# Patient Record
Sex: FEMALE | Race: WHITE | NOT HISPANIC OR LATINO | Employment: FULL TIME | URBAN - METROPOLITAN AREA
[De-identification: names, ages, dates, MRNs, and addresses within clinical notes are randomized per-mention and may not be internally consistent; named-entity substitution may affect disease eponyms.]

---

## 2024-03-21 ENCOUNTER — LAB (OUTPATIENT)
Dept: LAB | Facility: LAB | Age: 59
End: 2024-03-21
Payer: COMMERCIAL

## 2024-03-21 ENCOUNTER — OFFICE VISIT (OUTPATIENT)
Dept: PRIMARY CARE | Facility: CLINIC | Age: 59
End: 2024-03-21
Payer: COMMERCIAL

## 2024-03-21 VITALS
WEIGHT: 147 LBS | HEIGHT: 70 IN | SYSTOLIC BLOOD PRESSURE: 108 MMHG | HEART RATE: 80 BPM | TEMPERATURE: 98.1 F | DIASTOLIC BLOOD PRESSURE: 80 MMHG | OXYGEN SATURATION: 98 % | BODY MASS INDEX: 21.05 KG/M2

## 2024-03-21 DIAGNOSIS — E55.9 VITAMIN D DEFICIENCY: ICD-10-CM

## 2024-03-21 DIAGNOSIS — M25.569 RECURRENT KNEE PAIN, UNSPECIFIED LATERALITY: ICD-10-CM

## 2024-03-21 DIAGNOSIS — Z00.00 PHYSICAL EXAM: ICD-10-CM

## 2024-03-21 DIAGNOSIS — Z23 NEED FOR DIPHTHERIA-TETANUS-PERTUSSIS (TDAP) VACCINE: ICD-10-CM

## 2024-03-21 DIAGNOSIS — Z00.00 PHYSICAL EXAM: Primary | ICD-10-CM

## 2024-03-21 DIAGNOSIS — Z85.820 HX OF MELANOMA OF SKIN: ICD-10-CM

## 2024-03-21 LAB
25(OH)D3 SERPL-MCNC: 29 NG/ML (ref 30–100)
ALBUMIN SERPL BCP-MCNC: 4.5 G/DL (ref 3.4–5)
ALP SERPL-CCNC: 63 U/L (ref 33–110)
ALT SERPL W P-5'-P-CCNC: 10 U/L (ref 7–45)
ANION GAP SERPL CALC-SCNC: 9 MMOL/L (ref 10–20)
APPEARANCE UR: CLEAR
AST SERPL W P-5'-P-CCNC: 15 U/L (ref 9–39)
BACTERIA #/AREA URNS AUTO: ABNORMAL /HPF
BASOPHILS # BLD AUTO: 0.03 X10*3/UL (ref 0–0.1)
BASOPHILS NFR BLD AUTO: 0.7 %
BILIRUB SERPL-MCNC: 0.4 MG/DL (ref 0–1.2)
BILIRUB UR STRIP.AUTO-MCNC: NEGATIVE MG/DL
BUN SERPL-MCNC: 12 MG/DL (ref 6–23)
CALCIUM SERPL-MCNC: 9.2 MG/DL (ref 8.6–10.3)
CHLORIDE SERPL-SCNC: 105 MMOL/L (ref 98–107)
CHOLEST SERPL-MCNC: 200 MG/DL (ref 0–199)
CHOLESTEROL/HDL RATIO: 3.3
CO2 SERPL-SCNC: 30 MMOL/L (ref 21–32)
COLOR UR: ABNORMAL
CREAT SERPL-MCNC: 0.64 MG/DL (ref 0.5–1.05)
EGFRCR SERPLBLD CKD-EPI 2021: >90 ML/MIN/1.73M*2
EOSINOPHIL # BLD AUTO: 0.1 X10*3/UL (ref 0–0.7)
EOSINOPHIL NFR BLD AUTO: 2.2 %
ERYTHROCYTE [DISTWIDTH] IN BLOOD BY AUTOMATED COUNT: 12.2 % (ref 11.5–14.5)
GLUCOSE SERPL-MCNC: 93 MG/DL (ref 74–99)
GLUCOSE UR STRIP.AUTO-MCNC: NEGATIVE MG/DL
HCT VFR BLD AUTO: 43.9 % (ref 36–46)
HDLC SERPL-MCNC: 60.7 MG/DL
HGB BLD-MCNC: 14 G/DL (ref 12–16)
IMM GRANULOCYTES # BLD AUTO: 0.01 X10*3/UL (ref 0–0.7)
IMM GRANULOCYTES NFR BLD AUTO: 0.2 % (ref 0–0.9)
KETONES UR STRIP.AUTO-MCNC: NEGATIVE MG/DL
LDLC SERPL CALC-MCNC: 120 MG/DL
LEUKOCYTE ESTERASE UR QL STRIP.AUTO: ABNORMAL
LYMPHOCYTES # BLD AUTO: 1.54 X10*3/UL (ref 1.2–4.8)
LYMPHOCYTES NFR BLD AUTO: 34.2 %
MAGNESIUM SERPL-MCNC: 2.07 MG/DL (ref 1.6–2.4)
MCH RBC QN AUTO: 31.3 PG (ref 26–34)
MCHC RBC AUTO-ENTMCNC: 31.9 G/DL (ref 32–36)
MCV RBC AUTO: 98 FL (ref 80–100)
MONOCYTES # BLD AUTO: 0.37 X10*3/UL (ref 0.1–1)
MONOCYTES NFR BLD AUTO: 8.2 %
MUCOUS THREADS #/AREA URNS AUTO: ABNORMAL /LPF
NEUTROPHILS # BLD AUTO: 2.45 X10*3/UL (ref 1.2–7.7)
NEUTROPHILS NFR BLD AUTO: 54.5 %
NITRITE UR QL STRIP.AUTO: NEGATIVE
NON HDL CHOLESTEROL: 139 MG/DL (ref 0–149)
NRBC BLD-RTO: 0 /100 WBCS (ref 0–0)
PH UR STRIP.AUTO: 7 [PH]
PLATELET # BLD AUTO: 220 X10*3/UL (ref 150–450)
POTASSIUM SERPL-SCNC: 4.8 MMOL/L (ref 3.5–5.3)
PROT SERPL-MCNC: 6.5 G/DL (ref 6.4–8.2)
PROT UR STRIP.AUTO-MCNC: NEGATIVE MG/DL
RBC # BLD AUTO: 4.48 X10*6/UL (ref 4–5.2)
RBC # UR STRIP.AUTO: NEGATIVE /UL
RBC #/AREA URNS AUTO: ABNORMAL /HPF
SODIUM SERPL-SCNC: 139 MMOL/L (ref 136–145)
SP GR UR STRIP.AUTO: 1
SQUAMOUS #/AREA URNS AUTO: ABNORMAL /HPF
TRIGL SERPL-MCNC: 96 MG/DL (ref 0–149)
TSH SERPL-ACNC: 1.61 MIU/L (ref 0.44–3.98)
UROBILINOGEN UR STRIP.AUTO-MCNC: <2 MG/DL
VLDL: 19 MG/DL (ref 0–40)
WBC # BLD AUTO: 4.5 X10*3/UL (ref 4.4–11.3)
WBC #/AREA URNS AUTO: ABNORMAL /HPF

## 2024-03-21 PROCEDURE — 83036 HEMOGLOBIN GLYCOSYLATED A1C: CPT

## 2024-03-21 PROCEDURE — 80061 LIPID PANEL: CPT

## 2024-03-21 PROCEDURE — 85025 COMPLETE CBC W/AUTO DIFF WBC: CPT

## 2024-03-21 PROCEDURE — 1036F TOBACCO NON-USER: CPT | Performed by: FAMILY MEDICINE

## 2024-03-21 PROCEDURE — 83735 ASSAY OF MAGNESIUM: CPT

## 2024-03-21 PROCEDURE — 82306 VITAMIN D 25 HYDROXY: CPT

## 2024-03-21 PROCEDURE — 36415 COLL VENOUS BLD VENIPUNCTURE: CPT

## 2024-03-21 PROCEDURE — 84443 ASSAY THYROID STIM HORMONE: CPT

## 2024-03-21 PROCEDURE — 90471 IMMUNIZATION ADMIN: CPT | Performed by: FAMILY MEDICINE

## 2024-03-21 PROCEDURE — 90715 TDAP VACCINE 7 YRS/> IM: CPT | Performed by: FAMILY MEDICINE

## 2024-03-21 PROCEDURE — 80053 COMPREHEN METABOLIC PANEL: CPT

## 2024-03-21 PROCEDURE — 81001 URINALYSIS AUTO W/SCOPE: CPT

## 2024-03-21 PROCEDURE — 99396 PREV VISIT EST AGE 40-64: CPT | Performed by: FAMILY MEDICINE

## 2024-03-21 PROCEDURE — 87086 URINE CULTURE/COLONY COUNT: CPT

## 2024-03-21 RX ORDER — GLUCOSAM/CHONDRO/HERB 149/HYAL 750-100 MG
1000 TABLET ORAL EVERY 24 HOURS
COMMUNITY

## 2024-03-21 RX ORDER — CHOLECALCIFEROL (VITAMIN D3) 25 MCG
1000 TABLET ORAL
COMMUNITY

## 2024-03-21 NOTE — PROGRESS NOTES
Subjective   Patient ID: Greg Rich is a 58 y.o. female who presents for Annual Exam (PHYSICAL ).  HPI  Patient with past medical hx of melanoma , following regularly with Dermatology once a year presenting for a physical exam.   KOHLER BETH is her gynecologist.   .    last colonoscopy was done in 2017 ,recommendation to repeat in 10 years.   Patient is due to schedule mammogram   Pap smear done recently   her GYN schedule the mammogram   she saw dermatologist recently, she had melanoma 4 years ago , it was located on the L temple , Mohs surgery , no treat required after the surgery.   Jaw surgery was cosmetic.   sulfa drugs gets flu like symptoms , fever and acehs.   alcohol occasionally , not very much   lives with her dog.   no unintentional  weight loss   hot flashes , LMP 2019  the hot flashes are not very bothersome   mainly in the evening , does not need treatment for that .   she is in good spirits denied feeling depressed.   does not have decreased interest   she is enjoying gardening and hiking and biking.     go for walks daily , ride bikes one or twice a week.   no diet restrictions.     vitamin D couple of times a week.     works for [employer]     Lives with her boyfriend    Her mom is her emergency contact.     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     She stretches and plank and sit ups , tries to do them nightly.                   Review of Systems    Past Medical History:   Diagnosis Date    Dry eye syndrome of bilateral lacrimal glands     Dry eyes    Personal history of other diseases of the female genital tract     History of endometriosis    Personal history of other malignant neoplasm of skin     History of malignant neoplasm of skin       Past Surgical History:   Procedure Laterality Date    OTHER SURGICAL HISTORY  02/26/2020    Jaw surgery    OTHER SURGICAL HISTORY  02/26/2020    Laparoscopy      Social History     Socioeconomic History     "Marital status: Single     Spouse name: None    Number of children: None    Years of education: None    Highest education level: None   Occupational History    None   Tobacco Use    Smoking status: Never    Smokeless tobacco: Never   Substance and Sexual Activity    Alcohol use: Yes    Drug use: Never    Sexual activity: None   Other Topics Concern    None   Social History Narrative    None     Social Determinants of Health     Financial Resource Strain: Not on file   Food Insecurity: Not on file   Transportation Needs: Not on file   Physical Activity: Not on file   Stress: Not on file   Social Connections: Not on file   Intimate Partner Violence: Not on file   Housing Stability: Not on file      Family History   Problem Relation Name Age of Onset    Atrial fibrillation Mother      No Known Problems Father         MEDICATIONS AND ALLERGIES:    ALLERGIES Minocycline and Sulfamethoxazole-trimethoprim    MEDICATIONS   Current Outpatient Medications on File Prior to Visit   Medication Sig Dispense Refill    cholecalciferol (Vitamin D-3) 25 MCG (1000 UT) tablet Take 1 tablet (1,000 Units) by mouth 4 times a week.      omega 3-dha-epa-fish oil (Fish OiL) 1,000 mg (120 mg-180 mg) capsule Take 1 capsule (1,000 mg) by mouth once every 24 hours.       No current facility-administered medications on file prior to visit.        Objective   Visit Vitals  /80   Pulse 80   Temp 36.7 °C (98.1 °F)   Ht 1.778 m (5' 10\")   Wt 66.7 kg (147 lb)   SpO2 98%   BMI 21.09 kg/m²   Smoking Status Never   BSA 1.82 m²      Physical Exam  Constitutional:       Appearance: Normal appearance. She is normal weight.   HENT:      Head: Normocephalic.      Right Ear: Tympanic membrane normal.      Left Ear: Tympanic membrane normal.      Nose: Nose normal.      Mouth/Throat:      Pharynx: Oropharynx is clear.   Eyes:      Pupils: Pupils are equal, round, and reactive to light.   Cardiovascular:      Rate and Rhythm: Normal rate and regular " rhythm.      Pulses: Normal pulses.      Heart sounds: Normal heart sounds.   Pulmonary:      Effort: Pulmonary effort is normal.      Breath sounds: Normal breath sounds. No stridor. No rhonchi.   Abdominal:      General: Bowel sounds are normal. There is no distension.      Tenderness: There is no abdominal tenderness. There is no guarding.   Musculoskeletal:         General: No swelling or tenderness.   Skin:     Coloration: Skin is not jaundiced or pale.   Neurological:      General: No focal deficit present.      Mental Status: She is alert and oriented to person, place, and time. Mental status is at baseline.      Cranial Nerves: No cranial nerve deficit.      Sensory: No sensory deficit.      Motor: No weakness.      Coordination: Coordination normal.      Gait: Gait normal.      Deep Tendon Reflexes: Reflexes normal.   Psychiatric:         Mood and Affect: Mood normal.         Behavior: Behavior normal.         Thought Content: Thought content normal.         Judgment: Judgment normal.         1. Physical exam  Exam is unremarkable at this time. No evidence of acute or chronic infection. Lung sounds clear throughout without evidence of wheeze, rales or rhonchi. Cardiac exam is normal and there is no gallop, murmur or rub. Abdomen is soft and non-tender with normal bowel sounds throughout. Equal strength in all extremities with good deep tendon reflexes and normal peripheral pulses. Routine labs drawn at today's visit as indicated.    Continue healthy diet and exercise  Routine immunizations as shown above   Influenza: advised yearly         Limiting use of alcohol, reduce or abstain from tobacco use, abstain from substance abuse    Procedures -  Continue with routine eye exams  Continue with routine dental exams  Continue with routine Dermatology / Skin checks  Continue with routine self-breast exams  Colonoscopy due: in 2027 , last done 2017   Mammgram due: followign with GYN , last done 09/2023   Pap Smear  / GYN due: Done within the last 3 years  Bone Density Screening: Patient under 65 years - Not applicable  Patient has No history of smoking  Low dose lung CT in patients who have smoked for 30 years: Not applicable    - Urine Culture; Future  - TSH with reflex to Free T4 if abnormal; Future  - CBC and Auto Differential; Future  - Comprehensive Metabolic Panel; Future  - Hemoglobin A1C; Future  - Lipid Panel; Future  - Urinalysis with Reflex Microscopic; Future    2. Need for diphtheria-tetanus-pertussis (Tdap) vaccine  Due , will give today   - Tdap vaccine, age 7 years and older (ADACEL)    3. Vitamin D deficiency  Will check levels.   - Vitamin D 25-Hydroxy,Total (for eval of Vitamin D levels); Future  - Magnesium; Future    4. Hx of melanoma of skin  Followign with dermatology yearly       5. Recurrent knee pain, unspecified laterality  R knee pain   Exam showed slight swellign   Advised to ice it , Ibuprofen PRN , stretching exercises and strengthen the quadricep muscle   If not getting better, recommedn to contact us for Xray and PT>

## 2024-03-22 LAB
EST. AVERAGE GLUCOSE BLD GHB EST-MCNC: 123 MG/DL
HBA1C MFR BLD: 5.9 %

## 2024-03-23 LAB — BACTERIA UR CULT: NORMAL

## 2024-03-26 ENCOUNTER — LAB (OUTPATIENT)
Dept: LAB | Facility: LAB | Age: 59
End: 2024-03-26
Payer: COMMERCIAL

## 2024-03-26 DIAGNOSIS — R31.9 HEMATURIA, UNSPECIFIED TYPE: ICD-10-CM

## 2024-03-26 DIAGNOSIS — R31.9 HEMATURIA, UNSPECIFIED TYPE: Primary | ICD-10-CM

## 2024-03-26 LAB
APPEARANCE UR: CLEAR
BILIRUB UR STRIP.AUTO-MCNC: NEGATIVE MG/DL
COLOR UR: ABNORMAL
GLUCOSE UR STRIP.AUTO-MCNC: NEGATIVE MG/DL
KETONES UR STRIP.AUTO-MCNC: NEGATIVE MG/DL
LEUKOCYTE ESTERASE UR QL STRIP.AUTO: ABNORMAL
NITRITE UR QL STRIP.AUTO: NEGATIVE
PH UR STRIP.AUTO: 7 [PH]
PROT UR STRIP.AUTO-MCNC: NEGATIVE MG/DL
RBC # UR STRIP.AUTO: NEGATIVE /UL
RBC #/AREA URNS AUTO: NORMAL /HPF
SP GR UR STRIP.AUTO: 1
UROBILINOGEN UR STRIP.AUTO-MCNC: <2 MG/DL
WBC #/AREA URNS AUTO: NORMAL /HPF

## 2024-03-26 PROCEDURE — 87086 URINE CULTURE/COLONY COUNT: CPT

## 2024-03-26 PROCEDURE — 81001 URINALYSIS AUTO W/SCOPE: CPT

## 2024-03-28 LAB — BACTERIA UR CULT: NORMAL

## 2024-09-05 ENCOUNTER — APPOINTMENT (OUTPATIENT)
Dept: OBSTETRICS AND GYNECOLOGY | Facility: CLINIC | Age: 59
End: 2024-09-05
Payer: COMMERCIAL

## 2024-09-05 VITALS
HEIGHT: 70 IN | WEIGHT: 145 LBS | DIASTOLIC BLOOD PRESSURE: 70 MMHG | SYSTOLIC BLOOD PRESSURE: 120 MMHG | BODY MASS INDEX: 20.76 KG/M2

## 2024-09-05 DIAGNOSIS — Z01.419 ENCOUNTER FOR WELL WOMAN EXAM WITH ROUTINE GYNECOLOGICAL EXAM: Primary | ICD-10-CM

## 2024-09-05 DIAGNOSIS — N39.0 POSTCOITAL UTI: ICD-10-CM

## 2024-09-05 DIAGNOSIS — Z12.31 ENCOUNTER FOR SCREENING MAMMOGRAM FOR MALIGNANT NEOPLASM OF BREAST: ICD-10-CM

## 2024-09-05 PROCEDURE — 99396 PREV VISIT EST AGE 40-64: CPT | Performed by: NURSE PRACTITIONER

## 2024-09-05 PROCEDURE — 3008F BODY MASS INDEX DOCD: CPT | Performed by: NURSE PRACTITIONER

## 2024-09-05 PROCEDURE — 1036F TOBACCO NON-USER: CPT | Performed by: NURSE PRACTITIONER

## 2024-09-05 RX ORDER — NITROFURANTOIN 25; 75 MG/1; MG/1
CAPSULE ORAL
Qty: 30 CAPSULE | Refills: 0 | Status: SHIPPED | OUTPATIENT
Start: 2024-09-05

## 2024-09-05 NOTE — PROGRESS NOTES
"     HPI:   Greg Rich is a 58 y.o. who presents today for her annual gynecologic exam without complaints    She has the following concerns; None. She is doing well. No concerns. She is not currently sexually active, but would like refills of vaginal estrogen and Macrobid if she becomes sexually active in the future.   PAP in 2023 and 2022 were normal, negative HPV. PAP in 2021 was NILM and positive HPV (other).    GYN HISTORY:  Denies any PMB.   She is not currently sexually active.     PAP History   Last pap:   2023 Normal HPV Negative  History of abnormal pap: yes - positive HPV in 2021.   HPV vaccine: no  @paphx@    Health Screening  Family history of breast, uterine, ovarian or colon cancer: yes, maternal aunt has a hx of breast cancer.    Breast cancer: mammogram - needs an order.   Last mammogram: 2023    Colon cancer: due next year.       The patient feels safe at home.         Review of Systems:   Constitutional: no fever and no chills.  Cardiovascular: no chest pain.   Respiratory: no shortness of breath.   Gastrointestinal: no nausea, no abdominal pain and no constipation  Genitourinary: no dysuria, no urinary incontinence, no vaginal dryness, no pelvic pain and no vaginal discharge.   Neurological: no headache.  Psychiatric: no anxiety and no depression.              Objective         /70   Ht 1.78 m (5' 10.08\")   Wt 65.8 kg (145 lb)   BMI 20.76 kg/m²         Physical Exam:   Constitutional: Alert and in no acute distress. Well developed, well nourished.      Neck: No neck asymmetry. Supple. Thyroid not enlarged and there were no palpable thyroid nodules.      Cardiovascular: Heart rate and rhythm were normal, normal S1 and S2, no gallops, and no murmurs.      Pulmonary: No respiratory distress. Clear bilateral breath sounds.      Chest: Breasts: Normal appearance, no nipple discharge and no skin changes. Palpation of breasts and axillae: No palpable mass and no axillary lymphadenopathy.    "   Abdomen: Soft nontender; no abdominal mass palpated. Normal bowel sounds. No organomegaly.      Genitourinary:   - External genitalia: Normal.   - Palpation of lymph nodes in groin: No inguinal lymphadenopathy.   - Bartholin's Urethral and Skenes Glands: Normal.   - Urethra: Normal.    -Bladder: Normal on palpation.   - Vagina: + vaginal dryness.   - Cervix: Normal.   - Uterus: Normal. Right Adnexa/parametria: Normal. Left Adnexa/parametria: Normal.   - Perianal Area: Normal.      Skin: Normal skin color and pigmentation, normal skin turgor, and no rash     Psychiatric: Alert and oriented x 3. Affect normal to patient baseline. Mood: Appropriate.            Assessment/Plan       Diagnoses and all orders for this visit:  Encounter for well woman exam with routine gynecological exam  Here for well woman exam. She is doing well. Requests refills of Macrobid and estrace cream if needed. PAP is up to date. Mammogram ordered. Colonoscopy is due next year.   Encounter for screening mammogram for malignant neoplasm of breast  -     BI mammo bilateral screening tomosynthesis; Future  Postcoital UTI  -     nitrofurantoin, macrocrystal-monohydrate, (Macrobid) 100 mg capsule; Take once tablet after intercourse as needed  Follow-up annually; sooner if needed.       RALPH Frost-CNP

## 2024-09-30 ENCOUNTER — APPOINTMENT (OUTPATIENT)
Dept: PRIMARY CARE | Facility: CLINIC | Age: 59
End: 2024-09-30
Payer: COMMERCIAL

## 2024-09-30 VITALS
HEART RATE: 80 BPM | TEMPERATURE: 97.8 F | HEIGHT: 70 IN | BODY MASS INDEX: 20.81 KG/M2 | DIASTOLIC BLOOD PRESSURE: 68 MMHG | OXYGEN SATURATION: 98 % | SYSTOLIC BLOOD PRESSURE: 121 MMHG

## 2024-09-30 DIAGNOSIS — R05.3 CHRONIC COUGH: Primary | ICD-10-CM

## 2024-09-30 PROCEDURE — 1036F TOBACCO NON-USER: CPT | Performed by: FAMILY MEDICINE

## 2024-09-30 PROCEDURE — 99214 OFFICE O/P EST MOD 30 MIN: CPT | Performed by: FAMILY MEDICINE

## 2024-09-30 RX ORDER — ALBUTEROL SULFATE 90 UG/1
2 INHALANT RESPIRATORY (INHALATION) EVERY 4 HOURS PRN
Qty: 8.5 G | Refills: 0 | Status: SHIPPED | OUTPATIENT
Start: 2024-09-30 | End: 2025-09-30

## 2024-09-30 NOTE — PROGRESS NOTES
Subjective   Patient ID: Greg Rich is a 59 y.o. female who presents for Cough (Chronic cough been going on since last year).  HPI  Patient has a chronic cough for more than a year   Wax and wanes.   Not worse at night   Did not notice any triggers.   Dry cough   When she is having the spells , not much helps, have urge to cough.   One hour later coughs again.   No sore throat or anyt hign like htat feels fine.   Does not feel post nasal drip   Does not have acid reflux   Not worse int he morning.   Not related to physical activity.   Did nto have asthma growing up   No eczema.    Used to get a cough when she getrs a sinus infection , then clears , now the cough is all year long , nagging cough does not go away.           Review of Systems    Past Medical History:   Diagnosis Date    Dry eye syndrome of bilateral lacrimal glands     Dry eyes    Endometriosis     Herpes     Migraine     Personal history of other diseases of the female genital tract     History of endometriosis    Personal history of other malignant neoplasm of skin     History of malignant neoplasm of skin    Urinary tract infection     Varicella        Past Surgical History:   Procedure Laterality Date    BREAST BIOPSY      BREAST LUMPECTOMY      OTHER SURGICAL HISTORY  02/26/2020    Jaw surgery    OTHER SURGICAL HISTORY  02/26/2020    Laparoscopy      Social History     Socioeconomic History    Marital status: Single   Tobacco Use    Smoking status: Never    Smokeless tobacco: Never   Vaping Use    Vaping status: Never Used   Substance and Sexual Activity    Alcohol use: Not Currently    Drug use: Never    Sexual activity: Not Currently     Partners: Male      Family History   Problem Relation Name Age of Onset    Atrial fibrillation Mother Roxann     Cataracts Mother Roxann     Kidney failure Father      Stroke Maternal Grandfather Mario Head        MEDICATIONS AND ALLERGIES:    ALLERGIES Minocycline, Sulfa (sulfonamide antibiotics),  "Sulfadiazine, Sulfamethoxazole, Sulfamethoxazole-trimethoprim, and Trimethoprim    MEDICATIONS   Current Outpatient Medications on File Prior to Visit   Medication Sig Dispense Refill    cholecalciferol (Vitamin D-3) 25 MCG (1000 UT) tablet Take 1 tablet (1,000 Units) by mouth 4 times a week.      nitrofurantoin, macrocrystal-monohydrate, (Macrobid) 100 mg capsule Take once tablet after intercourse as needed 30 capsule 0     No current facility-administered medications on file prior to visit.              Objective   Visit Vitals  /68   Pulse 80   Temp 36.6 °C (97.8 °F)   Ht 1.778 m (5' 10\")   SpO2 98%   BMI 20.81 kg/m²   OB Status Postmenopausal   Smoking Status Never   BSA 1.8 m²          3/23/2021     3:29 PM 5/26/2021     3:05 PM 6/2/2022     2:06 PM 7/28/2023    10:08 AM 3/21/2024     8:10 AM 9/5/2024     2:22 PM 9/30/2024     8:33 AM   Vitals   Systolic 118 102 120 118 108 120 121   Diastolic 76 72 72 78 80 70 68   Heart Rate     80  80   Temp     36.7 °C (98.1 °F)  36.6 °C (97.8 °F)   Height (in)  1.797 m (5' 10.75\") 1.797 m (5' 10.75\") 1.797 m (5' 10.75\") 1.778 m (5' 10\") 1.78 m (5' 10.08\") 1.778 m (5' 10\")   Weight (lb) 135 143 148 145 147 145    BMI 18.57 kg/m2 20.09 kg/m2 20.79 kg/m2 20.37 kg/m2 21.09 kg/m2 20.76 kg/m2 20.81 kg/m2   BSA (m2) 1.76 m2 1.8 m2 1.83 m2 1.81 m2 1.82 m2 1.8 m2 1.8 m2   Visit Report     Report Report Report     Physical Exam  Cardiovascular:      Rate and Rhythm: Normal rate and regular rhythm.      Heart sounds: No murmur heard.     No friction rub. No gallop.   Pulmonary:      Effort: Pulmonary effort is normal. No respiratory distress.      Breath sounds: No stridor. No wheezing, rhonchi or rales.       Ears are clear       Assessment & Plan  Chronic cough  Will order chest Xray , will order PFT   Will start albuterol to be taken PRN   Will follow up in 1 months   We discussed other differential diagnosis , patient verbalized understanding.   Orders:    XR chest 2 views; " Future    Complete Pulmonary Function Test Pre/Post Bronchodialator (Spirometry Pre/Post/DLCO/Lung Volumes); Future    albuterol (ProAir HFA) 90 mcg/actuation inhaler; Inhale 2 puffs every 4 hours if needed for wheezing or shortness of breath.

## 2024-11-04 ENCOUNTER — APPOINTMENT (OUTPATIENT)
Dept: PRIMARY CARE | Facility: CLINIC | Age: 59
End: 2024-11-04
Payer: COMMERCIAL

## 2024-11-04 VITALS
DIASTOLIC BLOOD PRESSURE: 67 MMHG | WEIGHT: 147 LBS | TEMPERATURE: 97.8 F | HEIGHT: 70 IN | HEART RATE: 74 BPM | SYSTOLIC BLOOD PRESSURE: 117 MMHG | OXYGEN SATURATION: 98 % | BODY MASS INDEX: 21.05 KG/M2

## 2024-11-04 DIAGNOSIS — R05.3 CHRONIC COUGH: Primary | ICD-10-CM

## 2024-11-04 DIAGNOSIS — I70.0 CALCIFICATION OF AORTA (CMS-HCC): ICD-10-CM

## 2024-11-04 DIAGNOSIS — Z23 NEEDS FLU SHOT: ICD-10-CM

## 2024-11-04 PROCEDURE — 3008F BODY MASS INDEX DOCD: CPT | Performed by: FAMILY MEDICINE

## 2024-11-04 PROCEDURE — 1036F TOBACCO NON-USER: CPT | Performed by: FAMILY MEDICINE

## 2024-11-04 PROCEDURE — 90656 IIV3 VACC NO PRSV 0.5 ML IM: CPT | Performed by: FAMILY MEDICINE

## 2024-11-04 PROCEDURE — 90471 IMMUNIZATION ADMIN: CPT | Performed by: FAMILY MEDICINE

## 2024-11-04 PROCEDURE — 99213 OFFICE O/P EST LOW 20 MIN: CPT | Performed by: FAMILY MEDICINE

## 2024-11-04 NOTE — PROGRESS NOTES
aopSubjective   Patient ID: Greg Rich is a 59 y.o. female who presents for Follow-up (One month follow up on cough ).  HPI  PFT and chest Xray reassuring   Cough is betgter   Feels like she has a viral infection that imprpoved.   Still blows her nose   Sometimes drain mucous.     Review of Systems    Past Medical History:   Diagnosis Date    Dry eye syndrome of bilateral lacrimal glands     Dry eyes    Endometriosis     Herpes     Migraine     Personal history of other diseases of the female genital tract     History of endometriosis    Personal history of other malignant neoplasm of skin     History of malignant neoplasm of skin    Urinary tract infection     Varicella        Past Surgical History:   Procedure Laterality Date    BREAST BIOPSY      BREAST LUMPECTOMY      OTHER SURGICAL HISTORY  02/26/2020    Jaw surgery    OTHER SURGICAL HISTORY  02/26/2020    Laparoscopy      Social History     Socioeconomic History    Marital status: Single   Tobacco Use    Smoking status: Never    Smokeless tobacco: Never   Vaping Use    Vaping status: Never Used   Substance and Sexual Activity    Alcohol use: Not Currently    Drug use: Never    Sexual activity: Not Currently     Partners: Male      Family History   Problem Relation Name Age of Onset    Atrial fibrillation Mother Roxnan     Cataracts Mother Roxann     Kidney failure Father      Stroke Maternal Grandfather Mario Head        MEDICATIONS AND ALLERGIES:    ALLERGIES Minocycline, Sulfa (sulfonamide antibiotics), Sulfadiazine, Sulfamethoxazole, Sulfamethoxazole-trimethoprim, and Trimethoprim    MEDICATIONS   Current Outpatient Medications on File Prior to Visit   Medication Sig Dispense Refill    cholecalciferol (Vitamin D-3) 25 MCG (1000 UT) tablet Take 1 tablet (1,000 Units) by mouth 4 times a week.      albuterol (ProAir HFA) 90 mcg/actuation inhaler Inhale 2 puffs every 4 hours if needed for wheezing or shortness of breath. (Patient not taking: Reported on  "11/4/2024) 8.5 g 0    nitrofurantoin, macrocrystal-monohydrate, (Macrobid) 100 mg capsule Take once tablet after intercourse as needed 30 capsule 0     No current facility-administered medications on file prior to visit.              Objective   Visit Vitals  /67   Pulse 74   Temp 36.6 °C (97.8 °F)   Ht 1.778 m (5' 10\")   Wt 66.7 kg (147 lb)   SpO2 98%   BMI 21.09 kg/m²   OB Status Postmenopausal   Smoking Status Never   BSA 1.82 m²          5/26/2021     3:05 PM 6/2/2022     2:06 PM 7/28/2023    10:08 AM 3/21/2024     8:10 AM 9/5/2024     2:22 PM 9/30/2024     8:33 AM 11/4/2024     8:47 AM   Vitals   Systolic 102 120 118 108 120 121 117   Diastolic 72 72 78 80 70 68 67   Heart Rate    80  80 74   Temp    36.7 °C (98.1 °F)  36.6 °C (97.8 °F) 36.6 °C (97.8 °F)   Height (in) 1.797 m (5' 10.75\") 1.797 m (5' 10.75\") 1.797 m (5' 10.75\") 1.778 m (5' 10\") 1.78 m (5' 10.08\") 1.778 m (5' 10\") 1.778 m (5' 10\")   Weight (lb) 143 148 145 147 145  147   BMI 20.09 kg/m2 20.79 kg/m2 20.37 kg/m2 21.09 kg/m2 20.76 kg/m2 20.81 kg/m2 21.09 kg/m2   BSA (m2) 1.8 m2 1.83 m2 1.81 m2 1.82 m2 1.8 m2 1.8 m2 1.82 m2   Visit Report    Report Report Report Report     Physical Exam        Assessment & Plan  Chronic cough  Cough improved, she is going to hold on further testing for now.   Use antihistamine with change of season.   Please contact us if you decide to see ENT for futher testing.          Needs flu shot  Given today   Orders:    Flu vaccine, trivalent, preservative free, age 6 months and greater (Fluarix/Fluzone/Flulaval)    Calcification of aorta (CMS-HCC)  On cehst Xray   Offerred CT calcium score , she politely refused , will contact us if she changes her mind.                 "

## 2025-09-11 ENCOUNTER — APPOINTMENT (OUTPATIENT)
Dept: OBSTETRICS AND GYNECOLOGY | Facility: CLINIC | Age: 60
End: 2025-09-11
Payer: COMMERCIAL